# Patient Record
Sex: MALE | Race: WHITE | HISPANIC OR LATINO | Employment: FULL TIME | URBAN - METROPOLITAN AREA
[De-identification: names, ages, dates, MRNs, and addresses within clinical notes are randomized per-mention and may not be internally consistent; named-entity substitution may affect disease eponyms.]

---

## 2017-07-19 ENCOUNTER — HOSPITAL ENCOUNTER (EMERGENCY)
Facility: CLINIC | Age: 42
Discharge: HOME | End: 2017-07-19
Attending: EMERGENCY MEDICINE | Admitting: EMERGENCY MEDICINE

## 2017-07-19 ENCOUNTER — APPOINTMENT (OUTPATIENT)
Dept: GENERAL RADIOLOGY | Facility: CLINIC | Age: 42
End: 2017-07-19
Attending: EMERGENCY MEDICINE

## 2017-07-19 VITALS
TEMPERATURE: 98.8 F | HEIGHT: 70 IN | RESPIRATION RATE: 18 BRPM | DIASTOLIC BLOOD PRESSURE: 70 MMHG | OXYGEN SATURATION: 98 % | WEIGHT: 120 LBS | BODY MASS INDEX: 17.18 KG/M2 | SYSTOLIC BLOOD PRESSURE: 110 MMHG

## 2017-07-19 DIAGNOSIS — G89.29 CHRONIC BILATERAL LOW BACK PAIN WITH RIGHT-SIDED SCIATICA: ICD-10-CM

## 2017-07-19 DIAGNOSIS — M54.41 CHRONIC BILATERAL LOW BACK PAIN WITH RIGHT-SIDED SCIATICA: ICD-10-CM

## 2017-07-19 PROCEDURE — 99283 EMERGENCY DEPT VISIT LOW MDM: CPT

## 2017-07-19 PROCEDURE — 72100 X-RAY EXAM L-S SPINE 2/3 VWS: CPT

## 2017-07-19 PROCEDURE — 25000132 ZZH RX MED GY IP 250 OP 250 PS 637: Performed by: EMERGENCY MEDICINE

## 2017-07-19 RX ORDER — HYDROCODONE BITARTRATE AND ACETAMINOPHEN 5; 325 MG/1; MG/1
1-2 TABLET ORAL EVERY 6 HOURS PRN
Qty: 15 TABLET | Refills: 0 | Status: SHIPPED | OUTPATIENT
Start: 2017-07-19 | End: 2017-10-21

## 2017-07-19 RX ORDER — HYDROCODONE BITARTRATE AND ACETAMINOPHEN 5; 325 MG/1; MG/1
2 TABLET ORAL ONCE
Status: COMPLETED | OUTPATIENT
Start: 2017-07-19 | End: 2017-07-19

## 2017-07-19 RX ADMIN — HYDROCODONE BITARTRATE AND ACETAMINOPHEN 2 TABLET: 5; 325 TABLET ORAL at 16:13

## 2017-07-19 ASSESSMENT — ENCOUNTER SYMPTOMS
NUMBNESS: 0
DIFFICULTY URINATING: 0
FEVER: 0
CHILLS: 0
ROS GI COMMENTS: NO BOWEL INCONTINENCE
ABDOMINAL PAIN: 0
BACK PAIN: 1
VOMITING: 0
WEAKNESS: 0

## 2017-07-19 NOTE — ED AVS SNAPSHOT
Emergency Department    [address] [place] [address]    Phone:  [phone]    Fax:  [phone]                                       Ziggy Goldman   MRN: [id number]    Department:   Emergency Department   Date of Visit:  7/19/2017           After Visit Summary Signature Page     I have received my discharge instructions, and my questions have been answered. I have discussed any challenges I see with this plan with the nurse or doctor.    ..........................................................................................................................................  Patient/Patient Representative Signature      ..........................................................................................................................................  Patient Representative Print Name and Relationship to Patient    ..................................................               ................................................  Date                                            Time    ..........................................................................................................................................  Reviewed by Signature/Title    ...................................................              ..............................................  Date                                                            Time

## 2017-07-19 NOTE — DISCHARGE INSTRUCTIONS
Return to the emergency department or seek medical care as instructed if your symptoms fail to improve or significantly worsen.    Take Acetaminophen (aka Tylenol) or Norco (prescription pain medicine) and/or ibuprofen (aka Motrin/Advil, 600mg up to 4 times per day with food) as needed for symptom/pain relief; use as directed.    Ice area of pain for 20 minutes four times per day for the next two days    Follow-up as indicated on page 1.  Maintain adequate hydration and get plenty of rest.      Discharge Instructions  Back Pain  You were seen today for back pain. Back pain can have many causes, but most will get better without surgery or other specific treatment. Sometimes there is a herniated ( slipped ) disc. We don t usually do MRI scans to look for these right away, since most herniated discs will get better on their own with time.  Today, we did not find any evidence that your back pain was caused by a serious condition, such as an infection, fracture, or tumor. However, sometimes symptoms develop over time and cannot be found during an emergency visit, so it is very important that you follow up with your primary doctor.  Return to the Emergency Department if:    You develop a fever with your back pain.     You have weakness or change in sensation in one or both legs.    You lose control of your bowels or bladder, or can t empty your bladder.    Your pain gets much worse.     Follow-up with your doctor:    Unless your pain has completely gone away, please make an appointment with your doctor within one week.  You may need further management of your back pain, such as more pain medication, imaging such as an X-ray or MRI, or physical therapy.    What can I do to help myself?    Remain Active -- People are often afraid that they will hurt their back further or delay recovery by remaining active, but this is one of the best things you can do for your back. In fact, prolonged bed rest is not recommended. Studies  have shown that people with low back pain recover faster when they remain active. Movement helps to bring blood flow to the muscles and relieve muscle spasms as well as preventing loss of muscle strength.    Heat -- Using a heating pad can help with low back pain during the first few weeks. Do not sleep with a heating pad, as you can be burned.     Pain medications - You may take a pain medication such as Tylenol  (acetaminophen), Advil , Nuprin  (ibuprofen) or Aleve  (naproxen).  If you have been given a narcotic such as Vicodin  (hydrocodone with acetaminophen), Percocet  (oxycodone with acetaminophen), codeine, or a muscle relaxant such as Flexeril  (cyclobenzaprine) or Soma  (carisoprodol), do not drive for four hours after you have taken it. If the narcotic contains Tylenol  (acetaminophen), do not take Tylenol  with it. All narcotics will cause constipation, so eat a high fiber diet.   If you were given a prescription for medicine here today, be sure to read all of the information (including the package insert) that comes with your prescription.  This will include important information about the medicine, its side effects, and any warnings that you need to know about.  The pharmacist who fills the prescription can provide more information and answer questions you may have about the medicine.  If you have questions or concerns that the pharmacist cannot address, please call or return to the Emergency Department.   Opioid Medication Information    Pain medications are among the most commonly prescribed medicines, so we are including this information for all our patients. If you did not receive pain medication or get a prescription for pain medicine, you can ignore it.     You may have been given a prescription for an opioid (narcotic) pain medicine and/or have received a pain medicine while here in the Emergency Department. These medicines can make you drowsy or impaired. You must not drive, operate dangerous  equipment, or engage in any other dangerous activities while taking these medications. If you drive while taking these medications, you could be arrested for DUI, or driving under the influence. Do not drink any alcohol while you are taking these medications.     Opioid pain medications can cause addiction. If you have a history of chemical dependency of any type, you are at a higher risk of becoming addicted to pain medications.  Only take these prescribed medications to treat your pain when all other options have been tried. Take it for as short a time and as few doses as possible. Store your pain pills in a secure place, as they are frequently stolen and provide a dangerous opportunity for children or visitors in your house to start abusing these powerful medications. We will not replace any lost or stolen medicine.  As soon as your pain is better, you should flush all your remaining medication.     Many prescription pain medications contain Tylenol  (acetaminophen), including Vicodin , Tylenol #3 , Norco , Lortab , and Percocet .  You should not take any extra pills of Tylenol  if you are using these prescription medications or you can get very sick.  Do not ever take more than 3000 mg of acetaminophen in any 24 hour period.    All opioids tend to cause constipation. Drink plenty of water and eat foods that have a lot of fiber, such as fruits, vegetables, prune juice, apple juice and high fiber cereal.  Take a laxative if you don t move your bowels at least every other day. Miralax , Milk of Magnesia, Colace , or Senna  can be used to keep you regular.      Remember that you can always come back to the Emergency Department if you are not able to see your regular doctor in the amount of time listed above, if you get any new symptoms, or if there is anything that worries you.

## 2017-07-19 NOTE — ED AVS SNAPSHOT
Emergency Department    [address] [facility] [address]    Phone:  [phone]    Fax:  [phone]                                       Ziggy Goldman   MRN: [id number]    Department:   Emergency Department   Date of Visit:  7/19/2017           Patient Information     Date Of Birth          1975        Your diagnoses for this visit were:     Chronic bilateral low back pain with right-sided sciatica        You were seen by Monster Orourke DO.      Follow-up Information     Follow up with Primary care doctor In 1 week.        Follow up with  Emergency Department.    Specialty:  EMERGENCY MEDICINE    Why:  If symptoms worsen    Contact information:    [address] [facility] [address] [phone]        Discharge Instructions       Return to the emergency department or seek medical care as instructed if your symptoms fail to improve or significantly worsen.    Take Acetaminophen (aka Tylenol) or Norco (prescription pain medicine) and/or ibuprofen (aka Motrin/Advil, 600mg up to 4 times per day with food) as needed for symptom/pain relief; use as directed.    Ice area of pain for 20 minutes four times per day for the next two days    Follow-up as indicated on page 1.  Maintain adequate hydration and get plenty of rest.      Discharge Instructions  Back Pain  You were seen today for back pain. Back pain can have many causes, but most will get better without surgery or other specific treatment. Sometimes there is a herniated ( slipped ) disc. We don t usually do MRI scans to look for these right away, since most herniated discs will get better on their own with time.  Today, we did not find any evidence that your back pain was caused by a serious condition, such as an infection, fracture, or tumor. However, sometimes symptoms develop over time and cannot be found during an emergency visit, so it is very important that you follow up with your primary  doctor.  Return to the Emergency Department if:    You develop a fever with your back pain.     You have weakness or change in sensation in one or both legs.    You lose control of your bowels or bladder, or can t empty your bladder.    Your pain gets much worse.     Follow-up with your doctor:    Unless your pain has completely gone away, please make an appointment with your doctor within one week.  You may need further management of your back pain, such as more pain medication, imaging such as an X-ray or MRI, or physical therapy.    What can I do to help myself?    Remain Active -- People are often afraid that they will hurt their back further or delay recovery by remaining active, but this is one of the best things you can do for your back. In fact, prolonged bed rest is not recommended. Studies have shown that people with low back pain recover faster when they remain active. Movement helps to bring blood flow to the muscles and relieve muscle spasms as well as preventing loss of muscle strength.    Heat -- Using a heating pad can help with low back pain during the first few weeks. Do not sleep with a heating pad, as you can be burned.     Pain medications - You may take a pain medication such as Tylenol  (acetaminophen), Advil , Nuprin  (ibuprofen) or Aleve  (naproxen).  If you have been given a narcotic such as Vicodin  (hydrocodone with acetaminophen), Percocet  (oxycodone with acetaminophen), codeine, or a muscle relaxant such as Flexeril  (cyclobenzaprine) or Soma  (carisoprodol), do not drive for four hours after you have taken it. If the narcotic contains Tylenol  (acetaminophen), do not take Tylenol  with it. All narcotics will cause constipation, so eat a high fiber diet.   If you were given a prescription for medicine here today, be sure to read all of the information (including the package insert) that comes with your prescription.  This will include important information about the medicine, its side  effects, and any warnings that you need to know about.  The pharmacist who fills the prescription can provide more information and answer questions you may have about the medicine.  If you have questions or concerns that the pharmacist cannot address, please call or return to the Emergency Department.   Opioid Medication Information    Pain medications are among the most commonly prescribed medicines, so we are including this information for all our patients. If you did not receive pain medication or get a prescription for pain medicine, you can ignore it.     You may have been given a prescription for an opioid (narcotic) pain medicine and/or have received a pain medicine while here in the Emergency Department. These medicines can make you drowsy or impaired. You must not drive, operate dangerous equipment, or engage in any other dangerous activities while taking these medications. If you drive while taking these medications, you could be arrested for DUI, or driving under the influence. Do not drink any alcohol while you are taking these medications.     Opioid pain medications can cause addiction. If you have a history of chemical dependency of any type, you are at a higher risk of becoming addicted to pain medications.  Only take these prescribed medications to treat your pain when all other options have been tried. Take it for as short a time and as few doses as possible. Store your pain pills in a secure place, as they are frequently stolen and provide a dangerous opportunity for children or visitors in your house to start abusing these powerful medications. We will not replace any lost or stolen medicine.  As soon as your pain is better, you should flush all your remaining medication.     Many prescription pain medications contain Tylenol  (acetaminophen), including Vicodin , Tylenol #3 , Norco , Lortab , and Percocet .  You should not take any extra pills of Tylenol  if you are using these prescription  medications or you can get very sick.  Do not ever take more than 3000 mg of acetaminophen in any 24 hour period.    All opioids tend to cause constipation. Drink plenty of water and eat foods that have a lot of fiber, such as fruits, vegetables, prune juice, apple juice and high fiber cereal.  Take a laxative if you don t move your bowels at least every other day. Miralax , Milk of Magnesia, Colace , or Senna  can be used to keep you regular.      Remember that you can always come back to the Emergency Department if you are not able to see your regular doctor in the amount of time listed above, if you get any new symptoms, or if there is anything that worries you.        24 Hour Appointment Hotline       To make an appointment at any [facility], call [phone] ([phone]). If you don't have a family doctor or clinic, we will help you find one. [facility] clinics are conveniently located to serve the needs of you and your family.             Review of your medicines      START taking        Dose / Directions Last dose taken    HYDROcodone-acetaminophen 5-325 MG per tablet   Commonly known as:  NORCO   Dose:  1-2 tablet   Quantity:  15 tablet        Take 1-2 tablets by mouth every 6 hours as needed for moderate to severe pain   Refills:  0          Our records show that you are taking the medicines listed below. If these are incorrect, please call your family doctor or clinic.        Dose / Directions Last dose taken    IBUPROFEN PO   Dose:  1 tablet        Take 1 tablet by mouth every 4 hours as needed for moderate pain   Refills:  0                Prescriptions were sent or printed at these locations (1 Prescription)                   Other Prescriptions                Printed at Department/Unit printer (1 of 1)         HYDROcodone-acetaminophen (NORCO) 5-325 MG per tablet                Procedures and tests performed during your visit     Lumbar spine XR, 2-3 views      Orders Needing Specimen  Collection     None      Pending Results     Date and Time Order Name Status Description    7/19/2017 1611 Lumbar spine XR, 2-3 views Preliminary             Pending Culture Results     No orders found from 7/17/2017 to 7/20/2017.            Pending Results Instructions     If you had any lab results that were not finalized at the time of your Discharge, you can call the ED Lab Result RN at [phone]. You will be contacted by this team for any positive Lab results or changes in treatment. The nurses are available 7 days a week from 10A to 6:30P.  You can leave a message 24 hours per day and they will return your call.        Test Results From Your Hospital Stay        7/19/2017  4:33 PM      Narrative     LUMBAR SPINE TWO TO THREE VIEWS   7/19/2017 4:29 PM     HISTORY: Lumbar pain    COMPARISON: None.        Impression     IMPRESSION: Minimal leftward lumbar curvature. Normal posterior  alignment. Disc spaces are preserved. No evidence for fracture.                Clinical Quality Measure: Blood Pressure Screening     Your blood pressure was checked while you were in the emergency department today. The last reading we obtained was  BP: 116/73 . Please read the guidelines below about what these numbers mean and what you should do about them.  If your systolic blood pressure (the top number) is less than 120 and your diastolic blood pressure (the bottom number) is less than 80, then your blood pressure is normal. There is nothing more that you need to do about it.  If your systolic blood pressure (the top number) is 120-139 or your diastolic blood pressure (the bottom number) is 80-89, your blood pressure may be higher than it should be. You should have your blood pressure rechecked within a year by a primary care provider.  If your systolic blood pressure (the top number) is 140 or greater or your diastolic blood pressure (the bottom number) is 90 or greater, you may have high blood pressure. High blood pressure  "is treatable, but if left untreated over time it can put you at risk for heart attack, stroke, or kidney failure. You should have your blood pressure rechecked by a primary care provider within the next 4 weeks.  If your provider in the emergency department today gave you specific instructions to follow-up with your doctor or provider even sooner than that, you should follow that instruction and not wait for up to 4 weeks for your follow-up visit.        Thank you for choosing [facility]       Thank you for choosing [facility] for your care. Our goal is always to provide you with excellent care. Hearing back from our patients is one way we can continue to improve our services. Please take a few minutes to complete the written survey that you may receive in the mail after you visit with us. Thank you!        [employer] Information     [employer] lets you send messages to your doctor, view your test results, renew your prescriptions, schedule appointments and more. To sign up, go to [email or web address] . Click on \"Log in\" on the left side of the screen, which will take you to the Welcome page. Then click on \"Sign up Now\" on the right side of the page.     You will be asked to enter the access code listed below, as well as some personal information. Please follow the directions to create your username and password.     Your access code is: [email or web address]  Expires: 10/17/2017  5:03 PM     Your access code will  in 90 days. If you need help or a new code, please call your [facility] clinic or [phone].        Care EveryWhere ID     This is your Care EveryWhere ID. This could be used by other organizations to access your [facility] medical records  [id number]        Equal Access to Services     AMEENA BISWAS : Nimisha Stevens, david valdes, abdon whelan. So [facility] [phone].    ATENCIÓN: Si habla español, tiene a springer disposición servicios gratuitos de " asistencia lingüística. Maxwell al [phone].    We comply with applicable federal civil rights laws and [place] laws. We do not discriminate on the basis of race, color, national origin, age, disability sex, sexual orientation or gender identity.            After Visit Summary       This is your record. Keep this with you and show to your community pharmacist(s) and doctor(s) at your next visit.

## 2017-07-19 NOTE — LETTER
EMERGENCY DEPARTMENT  [address] [facility] [address]  [phone]    2017    Ziggy Goldman  [address]   [facility] [address]  [phone] (home)     : 1975      To Whom it may concern:    Ziggy Goldman was seen in our Emergency Department today, 2017.  I expect his condition to improve over the next 2 days.  He may return to work/school when improved.    Sincerely,        Monster Orourke, DO

## 2017-07-19 NOTE — ED PROVIDER NOTES
"  History     Chief Complaint:  Back Pain     HPI   Patient history translated by the patient's sister present at bedside    Ziggy Goldman is an otherwise healthy 41 year old male who presents for evaluation of atraumatic back pain. The patient reports he has had on and off back pain for the past 5-7 years. He denies any trauma or injury prior to onset of pain. 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 The patient took 800 mg Ibuprofen last night and 800 mg Ibuprofen this morning without significant relief, prompting sister to bring him to the ED for evaluation. On arrival to the ED, the patient reports he has pain in his bilateral lower back that worsens with positional changes and when he tries to stand up. Pain radiates down the back of his bilateral legs. He states he hears a \"crack\" in his back when he moves. The patient denies any new pain. He denies any numbness, tingling, or weakness. He denies any urinary or bowel incontinence. No fever, chills, abdominal pain, or difficulty urinating. He denies any recent falls or injuries. The patient has been ambulatory. Patient is requesting an x-ray.     Allergies:  No Known Allergies     Medications:    Ibuprofen    Past Medical History:    The patient does not have any past pertinent medical history.    Past Surgical History:    History reviewed. No pertinent surgical history.    Family History:    History reviewed. No pertinent family history.     Social History:  Smoking status: Former smoker  Alcohol use: No  Presents to the ED with his sister  Recently moved here from the [facility]     Review of Systems   Constitutional: Negative for chills and fever.   Gastrointestinal: Negative for abdominal pain and vomiting.        No bowel incontinence    Genitourinary: Negative for difficulty urinating and enuresis.   Musculoskeletal: Positive for back pain. Negative for gait problem.   Skin: Negative for rash.   Neurological: " "Negative for weakness and numbness.   All other systems reviewed and are negative.      Physical Exam     Patient Vitals for the past 24 hrs:   BP Temp Temp src Heart Rate Resp SpO2 Height Weight   07/19/17 1609 116/73 - - - - 100 % - -   07/19/17 1540 135/76 98.8  F (37.1  C) Oral 65 12 99 % 1.778 m (5' 10\") 54.4 kg (120 lb)       Physical Exam  General: Patient in moderate distress.  Alert and cooperative with exam. Normal mentation  HEENT: NC/AT. Conjunctiva without injection or scleral icterus. External ears normal.  Respiratory: Breathing comfortably on room air  CV: Normal rate, all extremities well perfused  GI:  Non-distended abdomen. No tend to pal  Skin: Warm, dry, no rashes/open wounds on exposed skin  Musculoskeletal: No obvious deformities. Mild tenderness to palpation of bilateral paraspinal muscles. No overlying skin changes. No midline lumbar tenderness. Lower extremity DTRs +2/4 CMS intact. Straight leg test negative.   Neuro: Alert, answers questions appropriately. No gross motor deficits    Emergency Department Course   Imaging:  Radiographic findings were communicated with the patient who voiced understanding of the findings.    X-ray Lumbar spine, 3 views:  Minimal leftward lumbar curvature. Normal posterior  alignment. Disc spaces are preserved. No evidence for fracture.  Result per radiology.     Interventions:  Norco 325 mg x2 tablets oral     Emergency Department Course:  Past medical records, nursing notes, and vitals reviewed.  1559: I performed an exam of the patient and obtained history, as documented above.  The patient was sent for a lumbar spine x-ray while in the emergency department, findings above.    1648: I rechecked the patient. Explained findings to the patient.    The patient passed a \"road test\" prior to discharge from the ED.    I rechecked the patient.  Findings and plan explained to the Patient. Patient discharged home with instructions regarding supportive care, " medications, and reasons to return. The importance of close follow-up was reviewed.     Impression & Plan      Medical Decision Making:  Ziggy Goldman is a 41 year old male who presents for evaluation of back pain that has been on and off for 5-7 years.  Pain has improved with interventions in the emergency department. X-ray obtained at the patient's request and are essentially negative as above. No red flag symptoms to suggest CT and/or MRI is indicated at this point.  There is no clinical evidence of cauda equina syndrome, discitis, spinal/epidural space hematoma or epidural abscess or other worrisome etiology. The neurological exam is normal and the patient's symptoms seem consistent with musculoskeletal pain. The patient will be discharged with pain medications to use as directed. Ice or heat to the back and stretching exercises. No heavy lifting, bending or twisting. Return if increasing pain, numbness, weakness, or bowel or bladder dysfunction. He was advised to schedule follow-up with his primary doctor within 3 days to re-assess symptoms.    Diagnosis:    ICD-10-CM   1. Chronic bilateral low back pain with right-sided sciatica M54.41    G89.29     Disposition: Discharged to home    Discharge Medications:  New Prescriptions    HYDROCODONE-ACETAMINOPHEN (NORCO) 5-325 MG PER TABLET    Take 1-2 tablets by mouth every 6 hours as needed for moderate to severe pain     Rissa Blanco  7/19/2017    EMERGENCY DEPARTMENT    I, Rissa Blanco, am serving as a scribe at 3:59 PM on 7/19/2017 to document services personally performed by Monster Orourke DO based on my observations and the provider's statements to me.        Monster Orourke DO  07/20/17 1115

## 2017-10-21 ENCOUNTER — HOSPITAL ENCOUNTER (EMERGENCY)
Facility: CLINIC | Age: 42
Discharge: HOME | End: 2017-10-21
Attending: EMERGENCY MEDICINE | Admitting: EMERGENCY MEDICINE
Payer: COMMERCIAL

## 2017-10-21 VITALS
TEMPERATURE: 97.7 F | DIASTOLIC BLOOD PRESSURE: 69 MMHG | SYSTOLIC BLOOD PRESSURE: 126 MMHG | OXYGEN SATURATION: 100 % | WEIGHT: 124 LBS | BODY MASS INDEX: 17.79 KG/M2 | RESPIRATION RATE: 18 BRPM

## 2017-10-21 DIAGNOSIS — T14.8XXA PUNCTURE: ICD-10-CM

## 2017-10-21 PROCEDURE — 99282 EMERGENCY DEPT VISIT SF MDM: CPT

## 2017-10-21 RX ORDER — LEVOFLOXACIN 500 MG/1
500 TABLET, FILM COATED ORAL DAILY
Qty: 7 TABLET | Refills: 0 | Status: SHIPPED | OUTPATIENT
Start: 2017-10-21

## 2017-10-21 ASSESSMENT — ENCOUNTER SYMPTOMS
DIARRHEA: 0
FEVER: 0
VOMITING: 0
WOUND: 1
COUGH: 0
MYALGIAS: 0

## 2017-10-21 NOTE — DISCHARGE INSTRUCTIONS
Herida Por Punción: Pie [Puncture Wound: Foot]  Maryann punción es maryann perforación en la piel. La herida puede atraer bacterias y acumular suciedad u objetos extraños, aumentando el riesgo de infección. No se suelen recetar antibióticos para esta lesión a menos que ya muestre señales de infección. Por lo tanto, es importante que examine hector la herida para ortiz si muestra alguna de las señales de infección descritas a continuación.  Si usted tenía puesto un zapato con suela de goma cuando el objeto puntiagudo penetró en el pie, es posible que ciertas bacterias en la suela del zapato (llamadas  pseudomonas ) hayan entrado en la herida e infectado la piel, el tendón o el hueso. Esta infección puede empezar tarde, hasta 2-3 semanas después de la lesión. Esta infección es más seria y difícil de tratar que las infecciones comunes de piel por estafilococos o estreptococos, por lo que es importante que siga estos consejos.    Cuidados En [place]:  1. Mantenga elevado el pie lesionado sierra las primeras 24-48 horas para reducir la hinchazón y el dolor.  2. NO SE APOYE sobre el pie lesionado si le duele al hacerlo.  3. Puede usar acetaminofén (Tylenol) o ibuprofeno (Motrin o Advil) para controlar el dolor, a menos que le hayan recetado otro medicamento. [NOTA: Si tiene maryann enfermedad hepática o renal crónica, o ha tenido alguna vez maryann úlcera estomacal o sangrado gastrointestinal, consulte con springer médico antes de aretha estos medicamentos.]  4. Puede ducharse taz de costumbre, patricia no moje la herida con agua (no se bañe ni se sumerja en piscinas) hasta que la herida se haya cerrado y haya dejado de supurar o sangrar.  5. Mantenga la herida limpia y seca. Si le colocaron un vendaje y éste se moja o se ensucia, cámbielo. De no ser así, mantenga la herida cubierta hasta que haya dejado de supurar o sangrar.  Visitas De Control:  La mayoría de las lesiones por punción sanan en un plazo de 10 días. De todas formas, a veces es  posible que ocurra maryann infección aun con el tratamiento adecuado. Si la herida acumula partículas extrañas (taz fragmentos de ropa, goma, zamudio o suciedad), es posible que se infecte. Estas partículas son muy difíciles de detectar sierra el primer examen, ya que no es posible ortiz hector el interior de la lesión y las partículas no aparecen en las radiografías. Si ocurre esto, será necesario administrar antibióticos y realizar un procedimiento quirúrgico sencillo para extraer el objeto extraño.  Sierra los próximos 2-3 días, revise la herida a diario para ortiz si muestra alguna de las señales de advertencia descritas a continuación. Si todavía tiene hinchazón o dolor en el pie al cabo de dos semanas, deberá ponerse en contacto con springer médico o regresar a francesca centro para que le aaron maryann radiografía a fin de determinar si tiene maryann infección en el hueso.  [NOTA: Las radiografías que le hayan hecho serán examinadas por un radiólogo y le informarán de los nuevos hallazgos que puedan afectar la atención médica que necesita.]  Busque Prontamente Atención Médica  si algo de lo siguiente ocurre:    Aumento del dolor    El pie se vuelve frío, azulado, entumecido o con hormigueo    Fiebre de 100.4 F (38 C) o más mert, o taz le haya indicado springer proveedor de atención médica    Enrojecimiento, calor, hinchazón o supuración de la herida    Dolor o hinchazón que salmeron dos días  Date Last Reviewed: 8/24/2015 2000-2017 [employer]. [address] [place] [address], [place] [address]. Todos los derechos reservados. Esta información no pretende sustituir la atención médica profesional. Sólo springer médico puede diagnosticar y tratar un problema de ted.

## 2017-10-21 NOTE — ED AVS SNAPSHOT
Emergency Department    [address] [place] [address]    Phone:  [phone]    Fax:  [phone]                                       Ziggy Goldman   MRN: [id number]    Department:   Emergency Department   Date of Visit:  10/21/2017           After Visit Summary Signature Page     I have received my discharge instructions, and my questions have been answered. I have discussed any challenges I see with this plan with the nurse or doctor.    ..........................................................................................................................................  Patient/Patient Representative Signature      ..........................................................................................................................................  Patient Representative Print Name and Relationship to Patient    ..................................................               ................................................  Date                                            Time    ..........................................................................................................................................  Reviewed by Signature/Title    ...................................................              ..............................................  Date                                                            Time

## 2017-10-21 NOTE — ED AVS SNAPSHOT
Emergency Department    [address] [facility] [address]    Phone:  [phone]    Fax:  [phone]                                       Ziggy Goldman   MRN: [id number]    Department:   Emergency Department   Date of Visit:  10/21/2017           Patient Information     Date Of Birth          1975        Your diagnoses for this visit were:     Puncture        You were seen by Chirag Pearson MD.      Follow-up Information     Follow up with  Emergency Department.    Specialty:  EMERGENCY MEDICINE    Why:  As needed, If symptoms worsen    Contact information:    [address] [facility] [id number] [phone]        Follow up with Your Primary Care Doctor In 5 days.        Discharge Instructions         Herida Por Punción: Pie [Puncture Wound: Foot]  Maryann punción es maryann perforación en la piel. La herida puede atraer bacterias y acumular suciedad u objetos extraños, aumentando el riesgo de infección. No se suelen recetar antibióticos para esta lesión a menos que ya muestre señales de infección. Por lo tanto, es importante que examine hector la herida para ortiz si muestra alguna de las señales de infección descritas a continuación.  Si usted tenía puesto un zapato con suela de goma cuando el objeto puntiagudo penetró en el pie, es posible que ciertas bacterias en la suela del zapato (llamadas  pseudomonas ) hayan entrado en la herida e infectado la piel, el tendón o el hueso. Esta infección puede empezar tarde, hasta 2-3 semanas después de la lesión. Esta infección es más seria y difícil de tratar que las infecciones comunes de piel por estafilococos o estreptococos, por lo que es importante que siga estos consejos.    Cuidados En [place]:  1. Mantenga elevado el pie lesionado sierra las primeras 24-48 horas para reducir la hinchazón y el dolor.  2. NO SE APOYE sobre el pie lesionado si le duele al hacerlo.  3. Puede usar acetaminofén (Tylenol) o ibuprofeno (Motrin  o Advil) para controlar el dolor, a menos que le hayan recetado otro medicamento. [NOTA: Si tiene maryann enfermedad hepática o renal crónica, o ha tenido alguna vez maryann úlcera estomacal o sangrado gastrointestinal, consulte con springer médico antes de aretha estos medicamentos.]  4. Puede ducharse taz de costumbre, patricia no moje la herida con agua (no se bañe ni se sumerja en piscinas) hasta que la herida se haya cerrado y haya dejado de supurar o sangrar.  5. Mantenga la herida limpia y seca. Si le colocaron un vendaje y éste se moja o se ensucia, cámbielo. De no ser así, mantenga la herida cubierta hasta que haya dejado de supurar o sangrar.  Visitas De Control:  La mayoría de las lesiones por punción sanan en un plazo de 10 días. De todas formas, a veces es posible que ocurra maryann infección aun con el tratamiento adecuado. Si la herida acumula partículas extrañas (taz fragmentos de ropa, goma, zamudio o suciedad), es posible que se infecte. Estas partículas son muy difíciles de detectar sierra el primer examen, ya que no es posible ortiz hector el interior de la lesión y las partículas no aparecen en las radiografías. Si ocurre esto, será necesario administrar antibióticos y realizar un procedimiento quirúrgico sencillo para extraer el objeto extraño.  Sierra los próximos 2-3 días, revise la herida a diario para ortiz si muestra alguna de las señales de advertencia descritas a continuación. Si todavía tiene hinchazón o dolor en el pie al cabo de dos semanas, deberá ponerse en contacto con springer médico o regresar a francesca centro para que le aaron maryann radiografía a fin de determinar si tiene maryann infección en el hueso.  [NOTA: Las radiografías que le hayan hecho serán examinadas por un radiólogo y le informarán de los nuevos hallazgos que puedan afectar la atención médica que necesita.]  Busque Prontamente Atención Médica  si algo de lo siguiente ocurre:    Aumento del dolor    El pie se vuelve frío, azulado, entumecido o con  hormigueo    Fiebre de 100.4 F (38 C) o más mert, o taz le haya indicado springer proveedor de atención médica    Enrojecimiento, calor, hinchazón o supuración de la herida    Dolor o hinchazón que salmeron dos días  Date Last Reviewed: 8/24/2015 2000-2017 [employer]. [address] [place] [address]. Todos los derechos reservados. Esta información no pretende sustituir la atención médica profesional. Sólo springer médico puede diagnosticar y tratar un problema de ted.          24 Hour Appointment Hotline       To make an appointment at any [facility] clinic, call [phone] ([phone]). If you don't have a family doctor or clinic, we will help you find one. [facility] clinics are conveniently located to serve the needs of you and your family.             Review of your medicines      START taking        Dose / Directions Last dose taken    levofloxacin 500 MG tablet   Commonly known as:  LEVAQUIN   Dose:  500 mg   Quantity:  7 tablet        Take 1 tablet (500 mg) by mouth daily   Refills:  0                Prescriptions were sent or printed at these locations (1 Prescription)                   Other Prescriptions                Printed at Department/Unit printer (1 of 1)         levofloxacin (LEVAQUIN) 500 MG tablet                Orders Needing Specimen Collection     None      Pending Results     No orders found from 10/19/2017 to 10/22/2017.            Pending Culture Results     No orders found from 10/19/2017 to 10/22/2017.            Pending Results Instructions     If you had any lab results that were not finalized at the time of your Discharge, you can call the ED Lab Result RN at [phone]. You will be contacted by this team for any positive Lab results or changes in treatment. The nurses are available 7 days a week from 10A to 6:30P.  You can leave a message 24 hours per day and they will return your call.        Test Results From Your Hospital Stay               Clinical Quality  Measure: Blood Pressure Screening     Your blood pressure was checked while you were in the emergency department today. The last reading we obtained was  BP: 126/69 . Please read the guidelines below about what these numbers mean and what you should do about them.  If your systolic blood pressure (the top number) is less than 120 and your diastolic blood pressure (the bottom number) is less than 80, then your blood pressure is normal. There is nothing more that you need to do about it.  If your systolic blood pressure (the top number) is 120-139 or your diastolic blood pressure (the bottom number) is 80-89, your blood pressure may be higher than it should be. You should have your blood pressure rechecked within a year by a primary care provider.  If your systolic blood pressure (the top number) is 140 or greater or your diastolic blood pressure (the bottom number) is 90 or greater, you may have high blood pressure. High blood pressure is treatable, but if left untreated over time it can put you at risk for heart attack, stroke, or kidney failure. You should have your blood pressure rechecked by a primary care provider within the next 4 weeks.  If your provider in the emergency department today gave you specific instructions to follow-up with your doctor or provider even sooner than that, you should follow that instruction and not wait for up to 4 weeks for your follow-up visit.        Thank you for choosing [facility]       Thank you for choosing [facility] for your care. Our goal is always to provide you with excellent care. Hearing back from our patients is one way we can continue to improve our services. Please take a few minutes to complete the written survey that you may receive in the mail after you visit with us. Thank you!        [facility] Information     [employer] lets you send messages to your doctor, view your test results, renew your prescriptions, schedule appointments and more. To sign up, go to  [email or web address] . Click on \"Log in\" on the left side of the screen, which will take you to the Welcome page. Then click on \"Sign up Now\" on the right side of the page.     You will be asked to enter the access code listed below, as well as some personal information. Please follow the directions to create your username and password.     Your access code is: [email or web address]  Expires: 2018 12:44 PM     Your access code will  in 90 days. If you need help or a new code, please call your [facility] clinic or [phone].        Care EveryWhere ID     This is your Care EveryWhere ID. This could be used by other organizations to access your [facility] medical records  [id number]        Equal Access to Services     AMEENA BISWAS : Nimisha Stevens, david valdes, gia bradley, abdon rosas . So [facility] [phone].    ATENCIÓN: Si habla español, tiene a springer disposición servicios gratuitos de asistencia lingüística. Llame al [phone].    We comply with applicable federal civil rights laws and [place] laws. We do not discriminate on the basis of race, color, national origin, age, disability, sex, sexual orientation, or gender identity.            After Visit Summary       This is your record. Keep this with you and show to your community pharmacist(s) and doctor(s) at your next visit.                  "

## 2017-10-21 NOTE — ED PROVIDER NOTES
History     Chief Complaint:  Wound check    HPI   Ziggy Goldman is a 42 year old male who presents with a right foot injury. At 1600 yesterday while at work, he stepped on a nail which punctured through his sneaker. The nail has since been taken out. Patient reports his tetanus is up to date. Patient denies fever, vomiting and cough.     Allergies:  No known drug allergies.    Medications:    Ibuprofen   Norco    Past Medical History:    History reviewed. No pertinent past medical history.    Past Surgical History:    History reviewed. No pertinent surgical history.    Family History:    History reviewed. No pertinent family history.    Social History:  Marital Status: Single  Presents to the ED with family.   Tobacco Use: Former smoker.   Alcohol Use: No       Review of Systems   Constitutional: Negative for fever.   Respiratory: Negative for cough.    Gastrointestinal: Negative for diarrhea and vomiting.   Musculoskeletal: Negative for myalgias.   Skin: Positive for wound.   All other systems reviewed and are negative.      Physical Exam   First Vitals:  BP: 126/69  Heart Rate: 63  Temp: 97.7  F (36.5  C)  Resp: 18  Weight: 56.2 kg (124 lb)  SpO2: 100 %      Physical Exam  General: Appears well-developed and well-nourished.   Head: No signs of trauma.   CV: Normal rate and regular rhythm.    Resp: Effort normal and breath sounds normal. No respiratory distress.   MSK: Normal range of motion.   Neuro: The patient is alert and oriented.  Strength in lower extremities normal and symmetrical.   Sensation normal. Speech normal.  GCS 15  Skin: Skin is warm and dry. No rash noted. Very small puncture wound to plantar aspect of right foot at base of great toe.  No foreign body, erythema, or drainage noted.  Psych: normal mood and affect. behavior is normal.       Emergency Department Course     Emergency Department Course:  Nursing notes and vitals reviewed.  I performed an exam of the patient as documented  above.  The above workup was undertaken.  Findings and plan explained to the Patient. Patient discharged home, status improved, with instructions regarding supportive care, medications, and reasons to return as well as the importance of close follow-up was reviewed. Patient was prescribed Levaquin.    Impression & Plan      Medical Decision Making:  Ziggy Goldman is a 42 year old male who presents for evaluation of a puncture wound to the right foot from nail through the shoe.  This was already closed naturally upon presentation to the ED.    Based on history, will begin antibiotic therapy and have them see their doctor for recheck. Exam is benign at this point.   No signs of lymphadenitis, lymphangitis, necrotizing fascitis, osteomyelitis, abscess, cellulitis, etc      Diagnosis:    ICD-10-CM    1. Puncture T14.8XXA        Disposition:   Discharged to home.     Discharge Medications:  New Prescriptions    LEVOFLOXACIN (LEVAQUIN) 500 MG TABLET    Take 1 tablet (500 mg) by mouth daily         Kayla FRIAS, vimal serving as a scribe on 10/21/2017 at 12:35 PM to personally document services performed by Dr. Pearson based on my observations and the provider's statements to me.    EMERGENCY DEPARTMENT       Chirag Pearson MD  10/21/17 3357

## 2020-10-24 ENCOUNTER — HOSPITAL ENCOUNTER (EMERGENCY)
Facility: CLINIC | Age: 45
Discharge: HOME | End: 2020-10-24
Attending: EMERGENCY MEDICINE | Admitting: EMERGENCY MEDICINE
Payer: COMMERCIAL

## 2020-10-24 VITALS
WEIGHT: 140 LBS | BODY MASS INDEX: 21.22 KG/M2 | HEIGHT: 68 IN | OXYGEN SATURATION: 100 % | RESPIRATION RATE: 18 BRPM | HEART RATE: 60 BPM | DIASTOLIC BLOOD PRESSURE: 75 MMHG | TEMPERATURE: 98.8 F | SYSTOLIC BLOOD PRESSURE: 122 MMHG

## 2020-10-24 DIAGNOSIS — M54.50 ACUTE RIGHT-SIDED LOW BACK PAIN WITHOUT SCIATICA: ICD-10-CM

## 2020-10-24 LAB
ALBUMIN UR-MCNC: NEGATIVE MG/DL
APPEARANCE UR: CLEAR
BILIRUB UR QL STRIP: NEGATIVE
COLOR UR AUTO: YELLOW
GLUCOSE UR STRIP-MCNC: NEGATIVE MG/DL
HGB UR QL STRIP: ABNORMAL
KETONES UR STRIP-MCNC: NEGATIVE MG/DL
LEUKOCYTE ESTERASE UR QL STRIP: NEGATIVE
MUCOUS THREADS #/AREA URNS LPF: PRESENT /LPF
NITRATE UR QL: NEGATIVE
PH UR STRIP: 7 PH (ref 5–7)
RBC #/AREA URNS AUTO: 4 /HPF (ref 0–2)
SOURCE: ABNORMAL
SP GR UR STRIP: 1.01 (ref 1–1.03)
UROBILINOGEN UR STRIP-MCNC: NORMAL MG/DL (ref 0–2)
WBC #/AREA URNS AUTO: 1 /HPF (ref 0–5)

## 2020-10-24 PROCEDURE — 99285 EMERGENCY DEPT VISIT HI MDM: CPT | Mod: 25

## 2020-10-24 PROCEDURE — 250N000013 HC RX MED GY IP 250 OP 250 PS 637: Performed by: EMERGENCY MEDICINE

## 2020-10-24 PROCEDURE — 96375 TX/PRO/DX INJ NEW DRUG ADDON: CPT

## 2020-10-24 PROCEDURE — 81001 URINALYSIS AUTO W/SCOPE: CPT | Performed by: EMERGENCY MEDICINE

## 2020-10-24 PROCEDURE — 250N000011 HC RX IP 250 OP 636: Performed by: EMERGENCY MEDICINE

## 2020-10-24 PROCEDURE — 96374 THER/PROPH/DIAG INJ IV PUSH: CPT

## 2020-10-24 RX ORDER — CYCLOBENZAPRINE HCL 10 MG
10 TABLET ORAL ONCE
Status: COMPLETED | OUTPATIENT
Start: 2020-10-24 | End: 2020-10-24

## 2020-10-24 RX ORDER — HYDROCODONE BITARTRATE AND ACETAMINOPHEN 5; 325 MG/1; MG/1
1 TABLET ORAL ONCE
Status: DISCONTINUED | OUTPATIENT
Start: 2020-10-24 | End: 2020-10-24

## 2020-10-24 RX ORDER — ACETAMINOPHEN 500 MG
1000 TABLET ORAL ONCE
Status: COMPLETED | OUTPATIENT
Start: 2020-10-24 | End: 2020-10-24

## 2020-10-24 RX ORDER — KETOROLAC TROMETHAMINE 15 MG/ML
15 INJECTION, SOLUTION INTRAMUSCULAR; INTRAVENOUS ONCE
Status: COMPLETED | OUTPATIENT
Start: 2020-10-24 | End: 2020-10-24

## 2020-10-24 RX ORDER — LIDOCAINE 4 G/G
1 PATCH TOPICAL ONCE
Status: DISCONTINUED | OUTPATIENT
Start: 2020-10-24 | End: 2020-10-24 | Stop reason: HOSPADM

## 2020-10-24 RX ORDER — IBUPROFEN 600 MG/1
600 TABLET, FILM COATED ORAL EVERY 6 HOURS PRN
Qty: 30 TABLET | Refills: 0 | Status: SHIPPED | OUTPATIENT
Start: 2020-10-24

## 2020-10-24 RX ORDER — ACETAMINOPHEN 500 MG
500-1000 TABLET ORAL EVERY 6 HOURS PRN
Qty: 30 TABLET | Refills: 0 | Status: SHIPPED | OUTPATIENT
Start: 2020-10-24 | End: 2020-10-31

## 2020-10-24 RX ORDER — CYCLOBENZAPRINE HCL 10 MG
10 TABLET ORAL 3 TIMES DAILY PRN
Qty: 15 TABLET | Refills: 0 | Status: SHIPPED | OUTPATIENT
Start: 2020-10-24 | End: 2020-10-30

## 2020-10-24 RX ORDER — LIDOCAINE 4 G/G
1 PATCH TOPICAL EVERY 12 HOURS PRN
Qty: 12 PATCH | Refills: 0 | Status: SHIPPED | OUTPATIENT
Start: 2020-10-24

## 2020-10-24 RX ORDER — OXYCODONE HYDROCHLORIDE 5 MG/1
5 TABLET ORAL ONCE
Status: COMPLETED | OUTPATIENT
Start: 2020-10-24 | End: 2020-10-24

## 2020-10-24 RX ORDER — MORPHINE SULFATE 4 MG/ML
4 INJECTION, SOLUTION INTRAMUSCULAR; INTRAVENOUS ONCE
Status: COMPLETED | OUTPATIENT
Start: 2020-10-24 | End: 2020-10-24

## 2020-10-24 RX ORDER — HYDROCODONE BITARTRATE AND ACETAMINOPHEN 5; 325 MG/1; MG/1
2 TABLET ORAL ONCE
Status: DISCONTINUED | OUTPATIENT
Start: 2020-10-24 | End: 2020-10-24

## 2020-10-24 RX ORDER — OXYCODONE HYDROCHLORIDE 5 MG/1
5 TABLET ORAL EVERY 6 HOURS PRN
Qty: 12 TABLET | Refills: 0 | Status: SHIPPED | OUTPATIENT
Start: 2020-10-24 | End: 2023-04-10

## 2020-10-24 RX ADMIN — KETOROLAC TROMETHAMINE 15 MG: 15 INJECTION, SOLUTION INTRAMUSCULAR; INTRAVENOUS at 14:48

## 2020-10-24 RX ADMIN — CYCLOBENZAPRINE 10 MG: 10 TABLET, FILM COATED ORAL at 14:47

## 2020-10-24 RX ADMIN — ACETAMINOPHEN 1000 MG: 500 TABLET, FILM COATED ORAL at 14:47

## 2020-10-24 RX ADMIN — OXYCODONE HYDROCHLORIDE 5 MG: 5 TABLET ORAL at 16:14

## 2020-10-24 RX ADMIN — LIDOCAINE 1 PATCH: 560 PATCH PERCUTANEOUS; TOPICAL; TRANSDERMAL at 16:20

## 2020-10-24 RX ADMIN — MORPHINE SULFATE 4 MG: 4 INJECTION, SOLUTION INTRAMUSCULAR; INTRAVENOUS at 14:51

## 2020-10-24 ASSESSMENT — ENCOUNTER SYMPTOMS
BACK PAIN: 1
FEVER: 0

## 2020-10-24 ASSESSMENT — MIFFLIN-ST. JEOR: SCORE: 1494.54

## 2020-10-24 NOTE — ED AVS SNAPSHOT
[facility] Emergency Dept  [address] [address]  Phone: [phone]  Fax: [phone]                                    Ziggy Goldman   MRN: [id number]    Department: [facility] Emergency Dept   Date of Visit: 10/24/2020           After Visit Summary Signature Page    I have received my discharge instructions, and my questions have been answered. I have discussed any challenges I see with this plan with the nurse or doctor.    ..........................................................................................................................................  Patient/Patient Representative Signature      ..........................................................................................................................................  Patient Representative Print Name and Relationship to Patient    ..................................................               ................................................  Date                                   Time    ..........................................................................................................................................  Reviewed by Signature/Title    ...................................................              ..............................................  Date                                               Time          22EPIC Rev 08/18

## 2020-10-24 NOTE — ED TRIAGE NOTES
Old injury to right lower back, pain exacerbated last night after lifting a tv. Given 100mcg fentayl from ems

## 2020-10-24 NOTE — ED NOTES
Bed: ED15  Expected date: 10/24/20  Expected time: 1:29 PM  Means of arrival: Ambulance  Comments:  514 47m back pain

## 2020-10-24 NOTE — DISCHARGE INSTRUCTIONS
Discharge Instructions  Back Pain  You were seen today for back pain. Back pain can have many causes, but most will get better without surgery or other specific treatment. Sometimes there is a herniated ( slipped ) disc. We do not usually do MRI scans to look for these right away, since most herniated discs will get better on their own with time.  Today, we did not find any evidence that your back pain was caused by a serious condition. However, sometimes symptoms develop over time and cannot be found during an emergency visit, so it is very important that you follow up with your primary provider.  Generally, every Emergency Department visit should have a follow-up clinic visit with either a primary or a specialty clinic/provider. Please follow-up as instructed by your emergency provider today.    Return to the Emergency Department if:  You develop a fever with your back pain.   You have weakness or change in sensation in one or both legs.  You lose control of your bowels or bladder, or cannot empty your bladder (cannot pee).  Your pain gets much worse.     Follow-up with your provider:  Unless your pain has completely gone away, please make an appointment with your provider within one week. Most of the routine care for back pain is available in a clinic and not the Emergency Department. You may need further management of your back pain, such as more pain medication, imaging such as an X-ray or MRI, or physical therapy.    What can I do to help myself?  Remain Active -- People are often afraid that they will hurt their back further or delay recovery by remaining active, but this is one of the best things you can do for your back. In fact, staying in bed for a long time to rest is not recommended. Studies have shown that people with low back pain recover faster when they remain active. Movement helps to bring blood flow to the muscles and relieve muscle spasms as well as preventing loss of muscle strength.  Heat --  Using a heating pad can help with low back pain during the first few weeks. Do not sleep with a heating pad, as you can be burned.   Pain medications - You may take a pain medication such as Tylenol  (acetaminophen), Advil , Motrin  (ibuprofen) or Aleve  (naproxen).  If you were given a prescription for medicine here today, be sure to read all of the information (including the package insert) that comes with your prescription.  This will include important information about the medicine, its side effects, and any warnings that you need to know about.  The pharmacist who fills the prescription can provide more information and answer questions you may have about the medicine.  If you have questions or concerns that the pharmacist cannot address, please call or return to the Emergency Department.   Remember that you can always come back to the Emergency Department if you are not able to see your regular provider in the amount of time listed above, if you get any new symptoms, or if there is anything that worries you.    Opioid Medication Information    You have been given a prescription for an opioid (narcotic) pain medicine and/or have received a pain medicine while here in the Emergency Department. These medicines can make you drowsy or impaired. You must not drive, operate dangerous equipment, or engage in any other dangerous activities while taking these medications. If you drive while taking these medications, you could be arrested for driving under the influence (DUI). Do not drink any alcohol while you are taking these medications.     Opioid pain medications can cause addiction. If you have a history of chemical dependency of any type, you are at a higher risk of becoming addicted to pain medications.  Only take these prescribed medications to treat your pain when all other options have been tried. Take it for as short a time and as few doses as possible. Store your pain pills in a secure place, as they are  frequently stolen and provide a dangerous opportunity for children or visitors in your house to start abusing these powerful medications. We will not replace any lost or stolen medicine.    If you do not finish your medication, it is a good idea to get rid of it but please do not flush it down the toilet. Please dispose of the remaining medication at a local pharmacy or law enforcement facility. The [place] [employer] has additional information on medication disposal: https://www.pca.[facility].[place]/living-green/managing-unwanted-medications.      Many prescription pain medications contain Tylenol  (acetaminophen), including Vicodin , Tylenol #3 , Norco , Lortab , and Percocet .  You should not take any extra pills of Tylenol  if you are using these prescription medications or you can get very sick.  Do not ever take more than 3000 mg of acetaminophen in any 24 hour period.    All opioids tend to cause constipation. Drink plenty of water and eat foods that have a lot of fiber, such as fruits, vegetables, prune juice, apple juice and high fiber cereal.  Take a laxative if you don t move your bowels at least every other day. Miralax , Milk of Magnesia, Colace , or Senna  can be used to keep you regular.

## 2020-10-24 NOTE — ED PROVIDER NOTES
"  History     Chief Complaint:  Back Pain    HPI   Ziggy Goldman is a 45 year old male who presents via EMS with right-sided lower back pain. Last night, he was lifting his TV, which weighs approximately 80 lbs, when he experienced a sudden sharp onset of back pain. He reports that he has been experienced back pain intermittently over the many years, although his pain today is significantly above baseline. Following the incident, he was able to ambulate while holding onto something, however this exacerbates his pain, causing him to fall due to severe discomfort. His pain is non-radiating and he also notes muscle spasms. He treated with ibuprofen this morning with minimal relief. He denies any fever, urinary symptoms, and changes to his bowel movements. He has no history of back surgery. Denies numbness/tingling or weakness. Of note, he is scheduled for inguinal hernia repair in 4 days.     Allergies:  No known drug allergies    Medications:    Levaquin  Desyrel    Past Medical History:    Inguinal hernia  Dry eyes  Pinguecula     Past Surgical History:    History reviewed. No pertinent surgical history.    Family History:    History reviewed. No pertinent family history.     Social History:  Smoking status: former  Alcohol use: no  Accompanied by sister  Marital Status:  Single [1]     Review of Systems   Constitutional: Negative for fever.   Musculoskeletal: Positive for back pain.   All other systems reviewed and are negative.    Physical Exam     Patient Vitals for the past 24 hrs:   BP Temp Temp src Pulse Resp SpO2 Height Weight   10/24/20 1532 -- 98.8  F (37.1  C) Temporal -- -- -- -- --   10/24/20 1339 122/75 -- -- 60 18 100 % 1.727 m (5' 8\") 63.5 kg (140 lb)     Physical Exam    General: Lying on his back. Appears comfortable lying on his back but any movement such as sitting up causes significant pain on right lower back  Eyes:  The pupils are equal and round    Conjunctivae and sclerae are normal  ENT:  "   Wearing a mask  Neck:  Normal range of motion  CV:  Regular rate, regular rhythm. DP/PT pulses 2+ bilaterally     Skin warm and well perfused     There is normal capillary refill to the toes  Resp:  Non labored breathing on room air    No tachypnea    No cough heard  GI:  Abdomen is soft, there is no rigidity    No distension    No rebound tenderness     No abdominal tenderness    No palpable hernia  MS:  Mild tenderness on right lower back. No midline thoracic/lumbar spine tenderness. +straight leg raise bilaterally  Skin:  No rash or acute skin lesions noted  Neuro:   Awake, alert.      Speech is normal and fluent.    Face is symmetric.     Moves all extremities equally    SILT on bilateral LE    Patellar reflexes 2+ bilaterally    There is normal motor strength: Iliopsoas, quadriceps, hamstrings, tibialis anterior, gastrocnemius, EHL  Psych: Normal affect.  Appropriate interactions.    Emergency Department Course   Laboratory:  Laboratory findings were communicated with the patient who voiced understanding of the findings.  Labs Ordered and Resulted from Time of ED Arrival Up to the Time of Departure from the ED   ROUTINE UA WITH MICROSCOPIC - Abnormal; Notable for the following components:       Result Value    Blood Urine Trace (*)     RBC Urine 4 (*)     Mucous Urine Present (*)     All other components within normal limits     Interventions:    1447 Tylenol, 1000 mg, PO  1447 Flexeril, 10 mg, PO  1448 Toradol, 14 mg, IV  1451 Morphine, 4 mg, IV  1600 Oxycodone 5 mg PO    Emergency Department Course:  Past medical records, nursing notes, and vitals reviewed.  1407: I performed an exam of the patient and obtained history, as documented above.     The patient provided a urine sample here in the emergency department. This was sent for laboratory testing, findings above.    1546: I rechecked the patient. Explained findings to patient.    1620: Patient wanting to go home    Findings and plan explained to the  Patient.    Impression & Plan    Medical Decision Making:  Ziggy Goldman is a 45 year old male who presents for evaluation of back pain that started after lifting an 80 lb television. He has a history of back pain in the past. Always has some off and on back pain but usually manageable. Pain has improved with interventions in the emergency department. The patient did not sustain any trauma, therefore x-rays are not necessary due to the low likelihood of fracture or subluxation.  No red flag symptoms to suggest CT and/or MRI is indicated at this point.  There is no clinical evidence of cauda equina syndrome, discitis, spinal/epidural space hematoma or epidural abscess or other worrisome etiology. The neurological exam is normal and the patient's symptoms seem consistent with musculoskeletal issues and significant muscle spasm. Doubt ureteral stone, UTI, AAA, intra-abdominal process, appendicitis, obstruction, etc. Discussed home versus observation. Patient would like to go home. Does seem improved on recheck and thinks he will be able to manage at home though still has pain when he moves which I discussed will continue. The patient will be discharged with pain medications to use as directed. Discussed SE of medications. Ice or heat to the back. No heavy lifting, bending or twisting. Return if increasing pain, numbness, weakness, or bowel or bladder dysfunction. He was advised to follow up with PCP which he actually already has scheduled for pre-op assessment for his hernia repair.    Diagnosis:    ICD-10-CM    1. Acute right-sided low back pain without sciatica  M54.5        Disposition:  Discharged to home    Discharge Medications:  Discharge Medication List as of 10/24/2020  4:37 PM      START taking these medications    Details   acetaminophen (TYLENOL) 500 MG tablet Take 1-2 tablets (500-1,000 mg) by mouth every 6 hours as needed for fever or pain Maximum daily dose of 3000 mg, Disp-30 tablet, R-0, Local Print       cyclobenzaprine (FLEXERIL) 10 MG tablet Take 1 tablet (10 mg) by mouth 3 times daily as needed for muscle spasms, Disp-15 tablet, R-0, Local Print      ibuprofen (ADVIL/MOTRIN) 600 MG tablet Take 1 tablet (600 mg) by mouth every 6 hours as needed for moderate pain, Disp-30 tablet, R-0, Local Print      Lidocaine (LIDOCARE) 4 % Patch Place 1 patch onto the skin every 12 hours as needed for moderate pain To prevent lidocaine toxicity, patient should be patch free for 12 hrs daily.Disp-12 patch, R-0Local Print      oxyCODONE (ROXICODONE) 5 MG tablet Take 1 tablet (5 mg) by mouth every 6 hours as needed for pain, Disp-12 tablet, R-0, Local Print           Scribe Disclosure:  I, Della Alvarado, am serving as a scribe at 1:37 PM on 10/24/2020 to document services personally performed by Prisca Puckett MD based on my observations and the provider's statements to me.     Della Alvarado  10/24/2020   [facility] EMERGENCY DEPT       Prisca Puckett MD  10/24/20 3602

## 2023-04-10 ENCOUNTER — HOSPITAL ENCOUNTER (EMERGENCY)
Facility: CLINIC | Age: 48
Discharge: HOME | End: 2023-04-10
Attending: EMERGENCY MEDICINE | Admitting: EMERGENCY MEDICINE
Payer: COMMERCIAL

## 2023-04-10 ENCOUNTER — APPOINTMENT (OUTPATIENT)
Dept: CT IMAGING | Facility: CLINIC | Age: 48
End: 2023-04-10
Attending: EMERGENCY MEDICINE
Payer: COMMERCIAL

## 2023-04-10 VITALS
WEIGHT: 135 LBS | RESPIRATION RATE: 14 BRPM | TEMPERATURE: 98.5 F | DIASTOLIC BLOOD PRESSURE: 66 MMHG | BODY MASS INDEX: 20.46 KG/M2 | HEART RATE: 71 BPM | SYSTOLIC BLOOD PRESSURE: 98 MMHG | HEIGHT: 68 IN | OXYGEN SATURATION: 97 %

## 2023-04-10 DIAGNOSIS — R93.89 ABNORMAL CT SCAN: ICD-10-CM

## 2023-04-10 DIAGNOSIS — J02.0 STREP PHARYNGITIS: ICD-10-CM

## 2023-04-10 LAB
ALBUMIN SERPL BCG-MCNC: 4 G/DL (ref 3.5–5.2)
ALP SERPL-CCNC: 104 U/L (ref 40–129)
ALT SERPL W P-5'-P-CCNC: 16 U/L (ref 10–50)
ANION GAP SERPL CALCULATED.3IONS-SCNC: 13 MMOL/L (ref 7–15)
AST SERPL W P-5'-P-CCNC: 15 U/L (ref 10–50)
BASOPHILS # BLD AUTO: 0.1 10E3/UL (ref 0–0.2)
BASOPHILS NFR BLD AUTO: 0 %
BILIRUB SERPL-MCNC: 0.4 MG/DL
BUN SERPL-MCNC: 10.2 MG/DL (ref 6–20)
CALCIUM SERPL-MCNC: 9.1 MG/DL (ref 8.6–10)
CHLORIDE SERPL-SCNC: 98 MMOL/L (ref 98–107)
CREAT SERPL-MCNC: 1.01 MG/DL (ref 0.67–1.17)
DEPRECATED HCO3 PLAS-SCNC: 26 MMOL/L (ref 22–29)
EOSINOPHIL # BLD AUTO: 0 10E3/UL (ref 0–0.7)
EOSINOPHIL NFR BLD AUTO: 0 %
ERYTHROCYTE [DISTWIDTH] IN BLOOD BY AUTOMATED COUNT: 12.6 % (ref 10–15)
GFR SERPL CREATININE-BSD FRML MDRD: >90 ML/MIN/1.73M2
GLUCOSE SERPL-MCNC: 130 MG/DL (ref 70–99)
GROUP A STREP BY PCR: DETECTED
HCT VFR BLD AUTO: 45.5 % (ref 40–53)
HGB BLD-MCNC: 15.7 G/DL (ref 13.3–17.7)
IMM GRANULOCYTES # BLD: 0.2 10E3/UL
IMM GRANULOCYTES NFR BLD: 1 %
LACTATE SERPL-SCNC: 1.1 MMOL/L (ref 0.7–2)
LYMPHOCYTES # BLD AUTO: 1 10E3/UL (ref 0.8–5.3)
LYMPHOCYTES NFR BLD AUTO: 4 %
MCH RBC QN AUTO: 27.9 PG (ref 26.5–33)
MCHC RBC AUTO-ENTMCNC: 34.5 G/DL (ref 31.5–36.5)
MCV RBC AUTO: 81 FL (ref 78–100)
MONOCYTES # BLD AUTO: 1.4 10E3/UL (ref 0–1.3)
MONOCYTES NFR BLD AUTO: 5 %
NEUTROPHILS # BLD AUTO: 24.4 10E3/UL (ref 1.6–8.3)
NEUTROPHILS NFR BLD AUTO: 90 %
NRBC # BLD AUTO: 0 10E3/UL
NRBC BLD AUTO-RTO: 0 /100
PLATELET # BLD AUTO: 326 10E3/UL (ref 150–450)
POTASSIUM SERPL-SCNC: 3.1 MMOL/L (ref 3.4–5.3)
PROT SERPL-MCNC: 7.3 G/DL (ref 6.4–8.3)
RBC # BLD AUTO: 5.62 10E6/UL (ref 4.4–5.9)
SODIUM SERPL-SCNC: 137 MMOL/L (ref 136–145)
WBC # BLD AUTO: 27 10E3/UL (ref 4–11)

## 2023-04-10 PROCEDURE — 250N000011 HC RX IP 250 OP 636: Performed by: EMERGENCY MEDICINE

## 2023-04-10 PROCEDURE — 83605 ASSAY OF LACTIC ACID: CPT | Performed by: EMERGENCY MEDICINE

## 2023-04-10 PROCEDURE — 80053 COMPREHEN METABOLIC PANEL: CPT | Performed by: EMERGENCY MEDICINE

## 2023-04-10 PROCEDURE — 99285 EMERGENCY DEPT VISIT HI MDM: CPT | Mod: 25

## 2023-04-10 PROCEDURE — 85025 COMPLETE CBC W/AUTO DIFF WBC: CPT | Performed by: EMERGENCY MEDICINE

## 2023-04-10 PROCEDURE — 250N000009 HC RX 250: Performed by: EMERGENCY MEDICINE

## 2023-04-10 PROCEDURE — 258N000003 HC RX IP 258 OP 636: Performed by: EMERGENCY MEDICINE

## 2023-04-10 PROCEDURE — 87040 BLOOD CULTURE FOR BACTERIA: CPT | Performed by: EMERGENCY MEDICINE

## 2023-04-10 PROCEDURE — 70491 CT SOFT TISSUE NECK W/DYE: CPT

## 2023-04-10 PROCEDURE — 36415 COLL VENOUS BLD VENIPUNCTURE: CPT | Performed by: EMERGENCY MEDICINE

## 2023-04-10 PROCEDURE — 87651 STREP A DNA AMP PROBE: CPT | Performed by: EMERGENCY MEDICINE

## 2023-04-10 PROCEDURE — 250N000013 HC RX MED GY IP 250 OP 250 PS 637: Performed by: EMERGENCY MEDICINE

## 2023-04-10 PROCEDURE — 96365 THER/PROPH/DIAG IV INF INIT: CPT | Mod: 59

## 2023-04-10 PROCEDURE — 96361 HYDRATE IV INFUSION ADD-ON: CPT

## 2023-04-10 PROCEDURE — 96375 TX/PRO/DX INJ NEW DRUG ADDON: CPT | Mod: 59

## 2023-04-10 RX ORDER — AMPICILLIN AND SULBACTAM 2; 1 G/1; G/1
3 INJECTION, POWDER, FOR SOLUTION INTRAMUSCULAR; INTRAVENOUS ONCE
Status: COMPLETED | OUTPATIENT
Start: 2023-04-10 | End: 2023-04-10

## 2023-04-10 RX ORDER — ACETAMINOPHEN 500 MG
1000 TABLET ORAL ONCE
Status: COMPLETED | OUTPATIENT
Start: 2023-04-10 | End: 2023-04-10

## 2023-04-10 RX ORDER — IOPAMIDOL 755 MG/ML
100 INJECTION, SOLUTION INTRAVASCULAR ONCE
Status: COMPLETED | OUTPATIENT
Start: 2023-04-10 | End: 2023-04-10

## 2023-04-10 RX ORDER — KETOROLAC TROMETHAMINE 15 MG/ML
15 INJECTION, SOLUTION INTRAMUSCULAR; INTRAVENOUS ONCE
Status: COMPLETED | OUTPATIENT
Start: 2023-04-10 | End: 2023-04-10

## 2023-04-10 RX ORDER — ONDANSETRON 4 MG/1
4 TABLET, ORALLY DISINTEGRATING ORAL EVERY 6 HOURS PRN
Qty: 15 TABLET | Refills: 0 | Status: SHIPPED | OUTPATIENT
Start: 2023-04-10

## 2023-04-10 RX ORDER — DEXAMETHASONE SODIUM PHOSPHATE 4 MG/ML
10 INJECTION, SOLUTION INTRA-ARTICULAR; INTRALESIONAL; INTRAMUSCULAR; INTRAVENOUS; SOFT TISSUE ONCE
Status: COMPLETED | OUTPATIENT
Start: 2023-04-10 | End: 2023-04-10

## 2023-04-10 RX ORDER — POTASSIUM CHLORIDE 1.5 G/1.58G
40 POWDER, FOR SOLUTION ORAL ONCE
Status: COMPLETED | OUTPATIENT
Start: 2023-04-10 | End: 2023-04-10

## 2023-04-10 RX ORDER — ONDANSETRON 2 MG/ML
4 INJECTION INTRAMUSCULAR; INTRAVENOUS ONCE
Status: COMPLETED | OUTPATIENT
Start: 2023-04-10 | End: 2023-04-10

## 2023-04-10 RX ORDER — OXYCODONE HYDROCHLORIDE 5 MG/1
5 TABLET ORAL EVERY 6 HOURS PRN
Qty: 10 TABLET | Refills: 0 | Status: SHIPPED | OUTPATIENT
Start: 2023-04-10 | End: 2023-04-13

## 2023-04-10 RX ADMIN — SODIUM CHLORIDE 60 ML: 9 INJECTION, SOLUTION INTRAVENOUS at 11:29

## 2023-04-10 RX ADMIN — IOPAMIDOL 100 ML: 755 INJECTION, SOLUTION INTRAVENOUS at 11:29

## 2023-04-10 RX ADMIN — AMPICILLIN SODIUM AND SULBACTAM SODIUM 3 G: 2; 1 INJECTION, POWDER, FOR SOLUTION INTRAMUSCULAR; INTRAVENOUS at 11:57

## 2023-04-10 RX ADMIN — SODIUM CHLORIDE 2000 ML: 9 INJECTION, SOLUTION INTRAVENOUS at 08:26

## 2023-04-10 RX ADMIN — DEXAMETHASONE SODIUM PHOSPHATE 10 MG: 4 INJECTION, SOLUTION INTRAMUSCULAR; INTRAVENOUS at 11:58

## 2023-04-10 RX ADMIN — ACETAMINOPHEN 1000 MG: 500 TABLET ORAL at 08:30

## 2023-04-10 RX ADMIN — KETOROLAC TROMETHAMINE 15 MG: 15 INJECTION, SOLUTION INTRAMUSCULAR; INTRAVENOUS at 11:58

## 2023-04-10 RX ADMIN — POTASSIUM CHLORIDE 40 MEQ: 1.5 POWDER, FOR SOLUTION ORAL at 12:39

## 2023-04-10 RX ADMIN — ONDANSETRON 4 MG: 2 INJECTION INTRAMUSCULAR; INTRAVENOUS at 08:30

## 2023-04-10 ASSESSMENT — ACTIVITIES OF DAILY LIVING (ADL)
ADLS_ACUITY_SCORE: 35
ADLS_ACUITY_SCORE: 38

## 2023-04-10 NOTE — ED TRIAGE NOTES
Flu like sx with sore throat and headache; family has been having similar sx     Triage Assessment     Row Name 04/10/23 3455       Triage Assessment (Adult)    Airway WDL WDL       Respiratory WDL    Respiratory WDL WDL       Skin Circulation/Temperature WDL    Skin Circulation/Temperature WDL WDL       Cardiac WDL    Cardiac WDL WDL       Peripheral/Neurovascular WDL    Peripheral Neurovascular WDL WDL       Cognitive/Neuro/Behavioral WDL    Cognitive/Neuro/Behavioral WDL WDL

## 2023-04-10 NOTE — LETTER
April 10, 2023      To Whom It May Concern:      Ziggy Marinelli was seen in our Emergency Department today, 04/10/23.  I expect his condition to improve over the next 3 days.  He may return to work when improved.    Sincerely,        Omid Aaron RN

## 2023-04-10 NOTE — ED PROVIDER NOTES
"  History     Chief Complaint:  Sore throat     HPI   Ziggy Marinelli is a 47 year old male without known chronic medical problems who presents with his mother for evaluation of a sore throat and headache.  He states that he has been feeling ill for several days, taking some over-the-counter pain medicine with some relief, feeling feverish and sweaty at times but no temperature officially measured.  He had some nonbloody vomiting yesterday evening, none today.  No recent antibiotics.  No cough.  No diarrhea.  No abdominal pain.  No prior similar issues.  He states that his throat discomfort is slightly more prominent on the left side.    Independent Historian:   His mother at bedside volunteers that she herself had RSV 20 days ago, and wonders whether he may have this as well.    Review of External Notes: I personally performed electronic chart review, I see that his creatinine was normal 2 months ago, his white blood cell count has historically ranged in the 9-11 range over the last few years.    ROS:  Review of Systems  All other systems are reviewed and negative except as above in HPI    Allergies:  No known drug allergies      Medications:    The patient is not currently taking any prescribed medications.     Past Medical History:    Inguinal hernia     Social History:  Patient is accompanied     Physical Exam     Patient Vitals for the past 24 hrs:   BP Temp Temp src Pulse Resp SpO2 Height Weight   04/10/23 0808 112/68 98.5  F (36.9  C) Oral 82 14 97 % 1.727 m (5' 8\") 61.2 kg (135 lb)      Physical Exam  General: Male semirecumbent in fast-track for, mother bedside  HENT: mucous membranes moist, no difficulty controlling secretions, anterior neck soft and supple without crepitus, no trismus, posterior oropharynx symmetric without visible asymmetric peritonsillar swelling but tonsils are kissing due to severe enlargement with moderate exudate and some erythema  Eyes: PERRL  CV: rate as above  Resp: " normal effort, speaks in full phrases, no stridor but voice slightly muffled, no cough observed  MSK: no bony tenderness to face or cervical spine  Skin: appropriately warm and dry, no facial erythema or vesicles  Neuro: alert, face symmetric, normal tone in extremities    Emergency Department Course   Imaging:  CT Soft Tissue Neck w Contrast   Final Result   IMPRESSION:   1. Findings consistent with tonsillitis as detailed. No drainable   fluid collection.   2. Bilateral cervical lymphadenopathy.   3. Recommend continued clinical follow-up.      ETHAN MACIAS MD            SYSTEM ID:  [id number]         Report per radiology    Laboratory:  Labs Ordered and Resulted from Time of ED Arrival to Time of ED Departure   COMPREHENSIVE METABOLIC PANEL - Abnormal       Result Value    Sodium 137      Potassium 3.1 (*)     Chloride 98      Carbon Dioxide (CO2) 26      Anion Gap 13      Urea Nitrogen 10.2      Creatinine 1.01      Calcium 9.1      Glucose 130 (*)     Alkaline Phosphatase 104      AST 15      ALT 16      Protein Total 7.3      Albumin 4.0      Bilirubin Total 0.4      GFR Estimate >90     CBC WITH PLATELETS AND DIFFERENTIAL - Abnormal    WBC Count 27.0 (*)     RBC Count 5.62      Hemoglobin 15.7      Hematocrit 45.5      MCV 81      MCH 27.9      MCHC 34.5      RDW 12.6      Platelet Count 326      % Neutrophils 90      % Lymphocytes 4      % Monocytes 5      % Eosinophils 0      % Basophils 0      % Immature Granulocytes 1      NRBCs per 100 WBC 0      Absolute Neutrophils 24.4 (*)     Absolute Lymphocytes 1.0      Absolute Monocytes 1.4 (*)     Absolute Eosinophils 0.0      Absolute Basophils 0.1      Absolute Immature Granulocytes 0.2      Absolute NRBCs 0.0     GROUP A STREPTOCOCCUS PCR THROAT SWAB - Abnormal    Group A strep by PCR Detected (*)    LACTIC ACID WHOLE BLOOD - Normal    Lactic Acid 1.1     BLOOD CULTURE   BLOOD CULTURE        Emergency Department Course &  Assessments:  Interventions:  Medications   ondansetron (ZOFRAN) injection 4 mg (4 mg Intravenous $Given 4/10/23 0830)   acetaminophen (TYLENOL) tablet 1,000 mg (1,000 mg Oral $Given 4/10/23 0830)   0.9% sodium chloride BOLUS (0 mLs Intravenous Stopped 4/10/23 1241)   ampicillin-sulbactam (UNASYN) 3 g vial to attach to  mL bag (0 g Intravenous Stopped 4/10/23 1241)   dexamethasone (DECADRON) injection 10 mg (10 mg Intravenous $Given 4/10/23 1158)   ketorolac (TORADOL) injection 15 mg (15 mg Intravenous $Given 4/10/23 1158)   iopamidol (ISOVUE-370) solution 100 mL (100 mLs Intravenous $Given 4/10/23 1129)   Saline (60 mLs Intravenous $Given 4/10/23 1129)   potassium chloride (KLOR-CON) Packet 40 mEq (40 mEq Oral $Given 4/10/23 1239)        Assessments:  1022 I obtained history and examined the patient as noted above.   1322 I rechecked the patient and explained findings.     Independent Interpretation (X-rays, CTs, rhythm strip):  I personally reviewed his CT images, no large peritonsillar abscess is seen, slight abnormalities on the left side present.    Social Determinants of Health affecting care:   Healthcare Access/Compliance    Disposition:  The patient was discharged to home.     Impression & Plan    Medical Decision Making:  He presents with acute symptoms highly suspicious for infection, and his strep test today is in fact positive.  Given his significant leukocytosis and slight asymmetry of symptoms, I felt that CT imaging was indicated to evaluate for the possibility of occult abscess, Rakesh noting that there are no focal findings of abscess on exam such as focal fluctuance amenable to bedside aspiration or incision and drainage.  He is tolerating his secretions.  He reported vomiting yesterday but is tolerating oral intake earlier today prehospital as well as here in the ED at this time.  While he may develop a peritonsillar abscess, there is none present at this time, no indication for immediate ENT  consultation nor do I think that he requires hospitalization in light of his current clinical trajectory which is favorable today.  However, I did explain that I cannot guarantee he will not develop a peritonsillar abscess, and strict return precautions were reviewed in detail.  We also discussed medications in detail.  He was given a first dose of antibiotics and steroids here in the ED.  Referral information provided for otolaryngology to facilitate close outpatient follow-up.  He was subsequently discharged home.    Diagnosis:    ICD-10-CM    1. Strep pharyngitis  J02.0       2. Abnormal CT scan  R93.89     peritonsillar phlegmon         Discharge Medications:  New Prescriptions    AMOXICILLIN-CLAVULANATE (AUGMENTIN) 875-125 MG TABLET    Take 1 tablet by mouth 2 times daily for 10 days    ONDANSETRON (ZOFRAN ODT) 4 MG ODT TAB    Take 1 tablet (4 mg) by mouth every 6 hours as needed for nausea    OXYCODONE (ROXICODONE) 5 MG TABLET    Take 1 tablet (5 mg) by mouth every 6 hours as needed for pain      Fredrick Leyva  4/10/2023   Walter Hu MD Reitsema, Jeffrey Alan, MD  04/10/23 0885

## 2023-04-15 LAB
BACTERIA BLD CULT: NO GROWTH
BACTERIA BLD CULT: NO GROWTH

## 2023-12-18 ENCOUNTER — HOSPITAL ENCOUNTER (EMERGENCY)
Facility: CLINIC | Age: 48
Discharge: HOME | End: 2023-12-18
Attending: PHYSICIAN ASSISTANT | Admitting: PHYSICIAN ASSISTANT
Payer: COMMERCIAL

## 2023-12-18 VITALS
RESPIRATION RATE: 18 BRPM | DIASTOLIC BLOOD PRESSURE: 75 MMHG | HEART RATE: 65 BPM | SYSTOLIC BLOOD PRESSURE: 107 MMHG | OXYGEN SATURATION: 99 % | TEMPERATURE: 98 F

## 2023-12-18 DIAGNOSIS — R31.9 HEMATURIA, UNSPECIFIED TYPE: ICD-10-CM

## 2023-12-18 DIAGNOSIS — M54.41 ACUTE RIGHT-SIDED LOW BACK PAIN WITH RIGHT-SIDED SCIATICA: ICD-10-CM

## 2023-12-18 LAB
ALBUMIN UR-MCNC: NEGATIVE MG/DL
ANION GAP SERPL CALCULATED.3IONS-SCNC: 9 MMOL/L (ref 7–15)
APPEARANCE UR: CLEAR
BILIRUB UR QL STRIP: NEGATIVE
BUN SERPL-MCNC: 12.5 MG/DL (ref 6–20)
CALCIUM SERPL-MCNC: 8.8 MG/DL (ref 8.6–10)
CHLORIDE SERPL-SCNC: 101 MMOL/L (ref 98–107)
COLOR UR AUTO: YELLOW
CREAT SERPL-MCNC: 1.05 MG/DL (ref 0.67–1.17)
DEPRECATED HCO3 PLAS-SCNC: 30 MMOL/L (ref 22–29)
EGFRCR SERPLBLD CKD-EPI 2021: 88 ML/MIN/1.73M2
GLUCOSE SERPL-MCNC: 88 MG/DL (ref 70–99)
GLUCOSE UR STRIP-MCNC: NEGATIVE MG/DL
HGB UR QL STRIP: ABNORMAL
KETONES UR STRIP-MCNC: NEGATIVE MG/DL
LEUKOCYTE ESTERASE UR QL STRIP: NEGATIVE
MUCOUS THREADS #/AREA URNS LPF: PRESENT /LPF
NITRATE UR QL: NEGATIVE
PH UR STRIP: 6.5 [PH] (ref 5–7)
POTASSIUM SERPL-SCNC: 3.6 MMOL/L (ref 3.4–5.3)
RBC URINE: 11 /HPF
SODIUM SERPL-SCNC: 140 MMOL/L (ref 135–145)
SP GR UR STRIP: 1.02 (ref 1–1.03)
SQUAMOUS EPITHELIAL: 1 /HPF
UROBILINOGEN UR STRIP-MCNC: NORMAL MG/DL
WBC URINE: 1 /HPF

## 2023-12-18 PROCEDURE — 250N000013 HC RX MED GY IP 250 OP 250 PS 637: Performed by: EMERGENCY MEDICINE

## 2023-12-18 PROCEDURE — 99283 EMERGENCY DEPT VISIT LOW MDM: CPT

## 2023-12-18 PROCEDURE — 80048 BASIC METABOLIC PNL TOTAL CA: CPT | Performed by: PHYSICIAN ASSISTANT

## 2023-12-18 PROCEDURE — 36415 COLL VENOUS BLD VENIPUNCTURE: CPT | Performed by: PHYSICIAN ASSISTANT

## 2023-12-18 PROCEDURE — 81003 URINALYSIS AUTO W/O SCOPE: CPT | Performed by: EMERGENCY MEDICINE

## 2023-12-18 RX ORDER — IBUPROFEN 600 MG/1
600 TABLET, FILM COATED ORAL ONCE
Status: COMPLETED | OUTPATIENT
Start: 2023-12-18 | End: 2023-12-18

## 2023-12-18 RX ORDER — OXYCODONE HYDROCHLORIDE 5 MG/1
5 TABLET ORAL ONCE
Status: COMPLETED | OUTPATIENT
Start: 2023-12-18 | End: 2023-12-18

## 2023-12-18 RX ORDER — CYCLOBENZAPRINE HCL 5 MG
5 TABLET ORAL 3 TIMES DAILY PRN
Qty: 21 TABLET | Refills: 0 | Status: SHIPPED | OUTPATIENT
Start: 2023-12-18 | End: 2023-12-25

## 2023-12-18 RX ORDER — CYCLOBENZAPRINE HCL 10 MG
10 TABLET ORAL ONCE
Status: COMPLETED | OUTPATIENT
Start: 2023-12-18 | End: 2023-12-18

## 2023-12-18 RX ADMIN — CYCLOBENZAPRINE 10 MG: 10 TABLET, FILM COATED ORAL at 12:12

## 2023-12-18 RX ADMIN — IBUPROFEN 600 MG: 600 TABLET ORAL at 12:12

## 2023-12-18 RX ADMIN — OXYCODONE HYDROCHLORIDE 5 MG: 5 TABLET ORAL at 12:12

## 2023-12-18 ASSESSMENT — ACTIVITIES OF DAILY LIVING (ADL)
ADLS_ACUITY_SCORE: 35

## 2023-12-18 NOTE — ED TRIAGE NOTES
Pt reports acute on chronic lower back pain that started on Friday. Pt states he was getting out of the car when it started. Pt denies fever/chills, denies incontinence or bowel/bladder issues. Last took ibuprofen at 0600     Triage Assessment (Adult)       Row Name 12/18/23 1206          Triage Assessment    Airway WDL WDL        Respiratory WDL    Respiratory WDL WDL        Skin Circulation/Temperature WDL    Skin Circulation/Temperature WDL WDL        Cardiac WDL    Cardiac WDL WDL        Peripheral/Neurovascular WDL    Peripheral Neurovascular WDL WDL        Cognitive/Neuro/Behavioral WDL    Cognitive/Neuro/Behavioral WDL WDL

## 2023-12-18 NOTE — ED NOTES
Tele-PIT/Intake Evaluation      Video-Visit Details    Type of service:  Video Visit    Video Start Time (time video started): 12:03 PM  Video End Time (time video stopped): 12:08 PM   Originating Location (pt. Location): [facility]  Distant Location (provider location):  [facility]  Mode of Communication:  Video Conference via [employer]  Patient verbally consented to [email or web address] televisit.    History:  48-year-old male presenting with left-sided low back pain.  He has had similar episodes recurrently in the past.  This latest episode started on Friday.  The pain is worse with certain movements and positions.  He denies any other symptoms such as fevers, chills, dysuria, hematuria.  He denies any weakness, numbness, incontinence.  He has been taking ibuprofen without relief.  He has been seen here previously for the same pain and was prescribed oxycodone which he notes helped his symptoms.    Exam:  Constitutional: healthy, alert, active, and no distress  Head: Normocephalic. Atraumatic.  ENT: Nose and external ears normal in appearance   Respiratory: Normal effort and rate, no respiratory distress   Musculoskeletal: No obvious deformities   Skin: No obvious rashes or lesions on exposed skin   Neurologic: Awake and alert, moving all extremities   Psychiatric: Normal behavior      Patient Vitals for the past 24 hrs:   BP Temp Temp src Pulse Resp SpO2   12/18/23 1205 (!) 167/71 98  F (36.7  C) Temporal 67 18 99 %       Appropriate interventions for symptom management were initiated if applicable.  Appropriate diagnostic tests were initiated if indicated.    Important information for subsequent clinician:  Acute on chronic back pain, likely musculoskeletal.  No red flags.  UA ordered as well as pain medications.    I briefly evaluated the patient and developed an initial plan of care. I discussed this plan and explained that this brief interaction does not constitute a full evaluation.  Patient/family understands that they should wait to be fully evaluated and discuss any test results with another clinician prior to leaving the hospital.       Saad Cervantes MD  12/18/23 1405

## 2023-12-18 NOTE — ED PROVIDER NOTES
History     Chief Complaint:  Back Pain       HPI   Ziggy Marinelli is a 48 year old male who presents with back pain. The patient states that yesterday when he was getting out of his car he developed new back pain mostly on the right side that shoots down his leg.  Patient has had history of sciatic and back pain in the past.  Patient denies any direct blunt trauma to the back.  No fevers.  No nausea or vomiting.  The pain is worse when he is moving and bending. Patient is urinating without problems and denies any hematuria or abdominal pain.  Denies any lower extremity weakness, saddle anesthesia,.  Patient has history of back pain and states it happens every few years because he has a weak back. Patient denies history of kidney stones.      Independent Historian:   None - Patient Only    Review of External Notes:        Medications:    The patient is currently on no regular medications.    Past Medical History:    Right hernia  Pinguecula    Past Surgical History:    Right ear surgery     Physical Exam   Patient Vitals for the past 24 hrs:   BP Temp Temp src Pulse Resp SpO2   12/18/23 1205 (!) 167/71 98  F (36.7  C) Temporal 67 18 99 %        Physical Exam  Vitals and nursing note reviewed.   Constitutional:       Appearance: He is not diaphoretic.   Eyes:      General: No scleral icterus.     Conjunctiva/sclera: Conjunctivae normal.   Cardiovascular:      Rate and Rhythm: Normal rate and regular rhythm.      Heart sounds: Normal heart sounds. No murmur heard.     No friction rub. No gallop.   Pulmonary:      Effort: Pulmonary effort is normal. No respiratory distress.      Breath sounds: Normal breath sounds. No stridor. No wheezing, rhonchi or rales.   Abdominal:      General: There is no distension.      Tenderness: There is no abdominal tenderness. There is no right CVA tenderness, left CVA tenderness, guarding or rebound.   Skin:     Coloration: Skin is not jaundiced or pale.   Neurological:       Mental Status: He is alert and oriented to person, place, and time. Mental status is at baseline.      Cranial Nerves: No cranial nerve deficit.      Sensory: Sensation is intact. No sensory deficit.      Motor: Motor function is intact. No weakness.      Comments: Myotomes L1-S1 intact bilaterally equal strength 5-5.   Psychiatric:         Mood and Affect: Mood normal.         Behavior: Behavior normal.         Thought Content: Thought content normal.         Emergency Department Course     Laboratory:  Labs Ordered and Resulted from Time of ED Arrival to Time of ED Departure   ROUTINE UA WITH MICROSCOPIC REFLEX TO CULTURE - Abnormal       Result Value    Color Urine Yellow      Appearance Urine Clear      Glucose Urine Negative      Bilirubin Urine Negative      Ketones Urine Negative      Specific Gravity Urine 1.018      Blood Urine Trace (*)     pH Urine 6.5      Protein Albumin Urine Negative      Urobilinogen Urine Normal      Nitrite Urine Negative      Leukocyte Esterase Urine Negative      Mucus Urine Present (*)     RBC Urine 11 (*)     WBC Urine 1      Squamous Epithelials Urine 1     BASIC METABOLIC PANEL - Abnormal    Sodium 140      Potassium 3.6      Chloride 101      Carbon Dioxide (CO2) 30 (*)     Anion Gap 9      Urea Nitrogen 12.5      Creatinine 1.05      GFR Estimate 88      Calcium 8.8      Glucose 88        Emergency Department Course & Assessments:    Interventions:  Medications   ibuprofen (ADVIL/MOTRIN) tablet 600 mg (600 mg Oral $Given 12/18/23 1212)   oxyCODONE (ROXICODONE) tablet 5 mg (5 mg Oral $Given 12/18/23 1212)   cyclobenzaprine (FLEXERIL) tablet 10 mg (10 mg Oral $Given 12/18/23 1212)        Assessments:  1745 Obtained the patients history and performed initial exam  1827 Patient states that he no longer wants CT and understands that he should come in to get one if symptoms get worse.    Social Determinants of Health affecting care:   None    Disposition:  The patient was  discharged to home.     Impression & Plan      Medical Decision Making:  Ziggy Marinelli is a 48 year old male who presents for evaluation of back pain that started after getting out of his car today.  He has had history of back pain in the past.  Pain has improved with interventions in the emergency department.  The patient did not sustain any traumatic injuries thus I do not feel he needs any formal imaging of his spine.  No red flag symptoms to suggest MRI is indicated at this point.  There is no clinical evidence of cauda equina syndrome, discitis, spinal/epidural space hematoma or epidural abscess or other worrisome etiology. The neurological exam is normal and the patient's symptoms seem consistent with musculoskeletal issues and significant muscle spasm.  However given that he does have blood in his urine even though without history of kidney stones I did discuss and offered noncontrast CT imaging to rule out kidney stone.  Patient at first agreed but then later he changed his mind as he wanted to go home rest and eat.  The patient did not want to stay for any CT imaging to rule out kidney stone.  We discussed signs and symptoms of kidney stones and he should return if pain gets worse, he develops any hematuria, abdominal pain, nausea vomiting fevers or worsening condition.Also return if increasing pain, numbness, weakness, or bowel or bladder dysfunction.  The patient will be discharged with pain medications to use as directed. Ice or heat to the back and stretching exercises. No heavy lifting, bending or twisting. He was advised to schedule follow-up with his primary doctor within 3 days to re-assess symptoms.          Diagnosis:    ICD-10-CM    1. Acute right-sided low back pain with right-sided sciatica  M54.41       2. Hematuria, unspecified type  R31.9            Discharge Medications:  New Prescriptions    CYCLOBENZAPRINE (FLEXERIL) 5 MG TABLET    Take 1 tablet (5 mg) by mouth 3 times daily as  needed for muscle spasms        Scribe Disclosure:  I, Ollietony Hernandezjuliane, am serving as a scribe at 4:47 PM on 12/18/2023 to document services personally performed by Miguel Pereira PA-C based on my observations and the provider's statements to me.     12/18/2023   Miguel Pereira PA-C Kruger, Jacob C, PA-C  12/19/23 1640